# Patient Record
Sex: MALE | Race: WHITE | NOT HISPANIC OR LATINO | Employment: STUDENT | URBAN - METROPOLITAN AREA
[De-identification: names, ages, dates, MRNs, and addresses within clinical notes are randomized per-mention and may not be internally consistent; named-entity substitution may affect disease eponyms.]

---

## 2024-08-02 ENCOUNTER — HOSPITAL ENCOUNTER (OUTPATIENT)
Dept: RADIOLOGY | Facility: HOSPITAL | Age: 19
Discharge: HOME | End: 2024-08-02
Payer: COMMERCIAL

## 2024-08-02 ENCOUNTER — OFFICE VISIT (OUTPATIENT)
Dept: ORTHOPEDIC SURGERY | Facility: HOSPITAL | Age: 19
End: 2024-08-02
Payer: COMMERCIAL

## 2024-08-02 DIAGNOSIS — M25.562 LEFT KNEE PAIN, UNSPECIFIED CHRONICITY: Primary | ICD-10-CM

## 2024-08-02 DIAGNOSIS — S83.282A TEAR OF LATERAL MENISCUS OF LEFT KNEE, INITIAL ENCOUNTER: Primary | ICD-10-CM

## 2024-08-02 DIAGNOSIS — M25.562 LEFT KNEE PAIN, UNSPECIFIED CHRONICITY: ICD-10-CM

## 2024-08-02 DIAGNOSIS — S83.282A TEAR OF LATERAL MENISCUS OF LEFT KNEE, INITIAL ENCOUNTER: ICD-10-CM

## 2024-08-02 PROCEDURE — 73564 X-RAY EXAM KNEE 4 OR MORE: CPT | Mod: LT

## 2024-08-02 PROCEDURE — 73721 MRI JNT OF LWR EXTRE W/O DYE: CPT | Mod: LT

## 2024-08-02 PROCEDURE — 99213 OFFICE O/P EST LOW 20 MIN: CPT | Performed by: ORTHOPAEDIC SURGERY

## 2024-08-02 NOTE — PROGRESS NOTES
HPI  19 y.o. male here today for follow up on Left knee pain. Patient was seen previously in 2021 for his L knee patient states that he been doing well with the knee up until a recent trip to [place] and then after golfing when he felt some acute pain on the lateral side of his knee and developed a fairly large effusion.  He comes to see me today for further evaluation he has been doing anti-inflammatories activity modification and modifying his activities and some therapeutic exercises but continues to have pain and swelling    This is a pleasant patient in no acute distress.  They are alert and oriented x3.  They are of normal mood and affect.  They are in no acute distress.  The patient's limb is warm and well-perfused.  They have intact sensation to light touch in all lower extremity dermatomes.  The patient's quadriceps and hamstring strength is 5 of 5.  The patient can do a straight leg raise    ROM is from 0-125 deg    The patient has a weak core.  The patient has tight hamstrings.  The patient has one out of three patellofemoral crepitus.  They have some mild increased patellar tilt    They have  a stable Lachman, negative posterior drawer.  No posterior sag.  The patient is stable to varus and valgus stress at both 0 and 30°    There is mild medial joint line tenderness.  Significant lateral jointline tenderness, positive Jaspreet's localized to lateral joint line with mechanical symptoms    The patient has moderate effusion    IMAGING  X-rays reviewed today reveal no gross fracture or dislocation.  Preserved joint spaces.    MRI - No MRI for review        ASSESSMENT/PLAN  This is a 19 y.o. male here today with pain secondary to  meniscus tear    Plan for an MRI for further evaluation of the Meniscus as pt has positive McMurrays on exam.  Follow up after imaging is complete.

## 2024-08-05 ENCOUNTER — TELEPHONE (OUTPATIENT)
Dept: ORTHOPEDIC SURGERY | Facility: HOSPITAL | Age: 19
End: 2024-08-05
Payer: COMMERCIAL

## 2024-08-05 ENCOUNTER — APPOINTMENT (OUTPATIENT)
Dept: ORTHOPEDIC SURGERY | Facility: HOSPITAL | Age: 19
End: 2024-08-05
Payer: COMMERCIAL

## 2024-08-05 DIAGNOSIS — M25.562 LEFT KNEE PAIN, UNSPECIFIED CHRONICITY: Primary | ICD-10-CM

## 2024-08-05 RX ORDER — MELOXICAM 15 MG/1
15 TABLET ORAL DAILY
Qty: 14 TABLET | Refills: 0 | Status: SHIPPED | OUTPATIENT
Start: 2024-08-05 | End: 2024-08-19

## 2024-08-05 RX ORDER — MELOXICAM 15 MG/1
15 TABLET ORAL DAILY
Qty: 14 TABLET | Refills: 0 | Status: CANCELLED | OUTPATIENT
Start: 2024-08-05 | End: 2024-08-19

## 2024-08-05 NOTE — TELEPHONE ENCOUNTER
Called patient to find out what pharmacy he would like us to send the Meloxicam order to.  Patient states the wants it to  go to the [facility] in [place] at [address] CARTER

## 2024-08-07 ENCOUNTER — HOSPITAL ENCOUNTER (OUTPATIENT)
Dept: RADIOLOGY | Facility: CLINIC | Age: 19
Discharge: HOME | End: 2024-08-07
Payer: COMMERCIAL

## 2024-08-07 ENCOUNTER — OFFICE VISIT (OUTPATIENT)
Dept: RHEUMATOLOGY | Facility: CLINIC | Age: 19
End: 2024-08-07
Payer: COMMERCIAL

## 2024-08-07 ENCOUNTER — LAB (OUTPATIENT)
Dept: LAB | Facility: LAB | Age: 19
End: 2024-08-07
Payer: COMMERCIAL

## 2024-08-07 VITALS
DIASTOLIC BLOOD PRESSURE: 66 MMHG | BODY MASS INDEX: 20.81 KG/M2 | WEIGHT: 157 LBS | SYSTOLIC BLOOD PRESSURE: 108 MMHG | HEIGHT: 73 IN | OXYGEN SATURATION: 96 % | HEART RATE: 84 BPM

## 2024-08-07 DIAGNOSIS — M45.9 ANKYLOSING SPONDYLITIS, UNSPECIFIED SITE OF SPINE (MULTI): ICD-10-CM

## 2024-08-07 DIAGNOSIS — M45.9 ANKYLOSING SPONDYLITIS, UNSPECIFIED SITE OF SPINE (MULTI): Primary | ICD-10-CM

## 2024-08-07 LAB
ALBUMIN SERPL BCP-MCNC: 4.2 G/DL (ref 3.4–5)
ALP SERPL-CCNC: 102 U/L (ref 33–120)
ALT SERPL W P-5'-P-CCNC: 12 U/L (ref 10–52)
ANION GAP SERPL CALC-SCNC: 13 MMOL/L (ref 10–20)
AST SERPL W P-5'-P-CCNC: 15 U/L (ref 9–39)
BILIRUB SERPL-MCNC: 0.3 MG/DL (ref 0–1.2)
BUN SERPL-MCNC: 15 MG/DL (ref 6–23)
CALCIUM SERPL-MCNC: 9.6 MG/DL (ref 8.6–10.6)
CHLORIDE SERPL-SCNC: 103 MMOL/L (ref 98–107)
CK SERPL-CCNC: 90 U/L (ref 0–325)
CO2 SERPL-SCNC: 31 MMOL/L (ref 21–32)
CREAT SERPL-MCNC: 0.93 MG/DL (ref 0.5–1.3)
CRP SERPL-MCNC: 2.24 MG/DL
EGFRCR SERPLBLD CKD-EPI 2021: >90 ML/MIN/1.73M*2
GLUCOSE SERPL-MCNC: 71 MG/DL (ref 74–99)
HBV CORE AB SER QL: NONREACTIVE
HBV CORE IGM SER QL: NONREACTIVE
HBV SURFACE AB SER-ACNC: <3.1 MIU/ML
HBV SURFACE AG SERPL QL IA: NONREACTIVE
HCV AB SER QL: NONREACTIVE
POTASSIUM SERPL-SCNC: 4.6 MMOL/L (ref 3.5–5.3)
PROT SERPL-MCNC: 7.8 G/DL (ref 6.4–8.2)
RHEUMATOID FACT SER NEPH-ACNC: 17 IU/ML (ref 0–15)
SODIUM SERPL-SCNC: 142 MMOL/L (ref 136–145)

## 2024-08-07 PROCEDURE — 82550 ASSAY OF CK (CPK): CPT

## 2024-08-07 PROCEDURE — 86780 TREPONEMA PALLIDUM: CPT

## 2024-08-07 PROCEDURE — 86704 HEP B CORE ANTIBODY TOTAL: CPT

## 2024-08-07 PROCEDURE — 87491 CHLMYD TRACH DNA AMP PROBE: CPT

## 2024-08-07 PROCEDURE — 72202 X-RAY EXAM SI JOINTS 3/> VWS: CPT

## 2024-08-07 PROCEDURE — 87389 HIV-1 AG W/HIV-1&-2 AB AG IA: CPT

## 2024-08-07 PROCEDURE — 36415 COLL VENOUS BLD VENIPUNCTURE: CPT

## 2024-08-07 PROCEDURE — 72110 X-RAY EXAM L-2 SPINE 4/>VWS: CPT | Performed by: STUDENT IN AN ORGANIZED HEALTH CARE EDUCATION/TRAINING PROGRAM

## 2024-08-07 PROCEDURE — 86235 NUCLEAR ANTIGEN ANTIBODY: CPT

## 2024-08-07 PROCEDURE — 81381 HLA I TYPING 1 ALLELE HR: CPT

## 2024-08-07 PROCEDURE — 86706 HEP B SURFACE ANTIBODY: CPT

## 2024-08-07 PROCEDURE — 72120 X-RAY BEND ONLY L-S SPINE: CPT

## 2024-08-07 PROCEDURE — 86225 DNA ANTIBODY NATIVE: CPT

## 2024-08-07 PROCEDURE — 81001 URINALYSIS AUTO W/SCOPE: CPT

## 2024-08-07 PROCEDURE — 99205 OFFICE O/P NEW HI 60 MIN: CPT | Performed by: STUDENT IN AN ORGANIZED HEALTH CARE EDUCATION/TRAINING PROGRAM

## 2024-08-07 PROCEDURE — 86803 HEPATITIS C AB TEST: CPT

## 2024-08-07 PROCEDURE — 80053 COMPREHEN METABOLIC PANEL: CPT

## 2024-08-07 PROCEDURE — 86038 ANTINUCLEAR ANTIBODIES: CPT

## 2024-08-07 PROCEDURE — 87340 HEPATITIS B SURFACE AG IA: CPT

## 2024-08-07 PROCEDURE — 86431 RHEUMATOID FACTOR QUANT: CPT

## 2024-08-07 PROCEDURE — 86481 TB AG RESPONSE T-CELL SUSP: CPT

## 2024-08-07 PROCEDURE — 86618 LYME DISEASE ANTIBODY: CPT

## 2024-08-07 PROCEDURE — 86140 C-REACTIVE PROTEIN: CPT

## 2024-08-07 PROCEDURE — 82085 ASSAY OF ALDOLASE: CPT

## 2024-08-07 PROCEDURE — 1036F TOBACCO NON-USER: CPT | Performed by: STUDENT IN AN ORGANIZED HEALTH CARE EDUCATION/TRAINING PROGRAM

## 2024-08-07 PROCEDURE — 85652 RBC SED RATE AUTOMATED: CPT

## 2024-08-07 PROCEDURE — 3008F BODY MASS INDEX DOCD: CPT | Performed by: STUDENT IN AN ORGANIZED HEALTH CARE EDUCATION/TRAINING PROGRAM

## 2024-08-07 PROCEDURE — 72202 X-RAY EXAM SI JOINTS 3/> VWS: CPT | Performed by: STUDENT IN AN ORGANIZED HEALTH CARE EDUCATION/TRAINING PROGRAM

## 2024-08-07 PROCEDURE — 85025 COMPLETE CBC W/AUTO DIFF WBC: CPT

## 2024-08-07 PROCEDURE — 87591 N.GONORRHOEAE DNA AMP PROB: CPT

## 2024-08-07 PROCEDURE — 86200 CCP ANTIBODY: CPT

## 2024-08-07 PROCEDURE — 86705 HEP B CORE ANTIBODY IGM: CPT

## 2024-08-07 NOTE — PATIENT INSTRUCTIONS
"I want to work you up for a few things    This seems like an \"inflammatory arthritis \" but you dont have too many signs today    Things to consider are ankylosing spondylitis/spondylarthritis      Lyme less likely but we can get the test for it    Lets get a rheumatoid panel, xrays of your lower back today, a HLAb27 test, inflammatory markers    If you're here on a day that your knee is swollen, call and make an appt, and I can drain it and see what's in the fluid (I am here M-Th)    I will reach out to you tomorrow and let you know what the xrays showed; and then I can order a stat MRI unless its very obvious        "

## 2024-08-07 NOTE — PROGRESS NOTES
Subjective   Patient ID: Mo Issa is a 19 y.o. male who presents for No chief complaint on file..  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 Mild marrow edema in particular about the proximal tibiofibular articulation as well as posterior nonweightbearing lateral femoral condyle and anterior margin of the lateral femoral condyle. This is seen in conjunction with minimal marrow edema outer margin medial femoral condyle and posterior margin medial tibial plateau. In the setting of trauma these findings suggest bone bruise as a potential etiology. However, without corresponding history of trauma other potential etiologies such as inflammatory arthropathy or even bone marrow edema syndrome with no other potential etiology found.      2. No internal derangement. No meniscal tear. No sequela of ligamentous sprain.      3. Small knee joint effusion.      4. Small leaking popliteal cyst with moderate fluid tracking from the popliteal cyst caudally along the medial head of the gastrocnemius.      5. No evidence for OCD lesion            Rheumatology specific review of systems  joint pain in both knees and back and hips,  "some days stiff in lower back for half an hour when he wakes up; usually unilateral, but can be left or right, fevers , chills, unintentional weight loss, rashes, alopecia, mouth sores, nasal ulcers, photosensitivity, Raynauds, dry eyes, dry mouth, blood clots,  rheum fam hx, uveitis, blood or mucus in stool     Chronic pain specific review of systems  widespread pain , widespread tenderness, brain fog, depression/anxiety, migraines or tension headaches, IBS or heartburn symptoms, irritable or overactive bladder, pelvic pain ,TMJ pain,poor sleep, fatigue    Objective   There were no vitals taken for this visit.      Physical Exam  Constitutional: Alert and in no acute distress. Well developed, well nourished  Head and Face: Head and face: Normal.    Cardiovascular: Heart rate and rhythm were normal, normal S1 and S2. No peripheral edema.   Pulmonary: No respiratory distress. Clear bilateral breath sounds.  Musculoskeletal: no synovitis throughout, no SI joint tenderness, full strength throughout  Skin: Normal skin color and pigmentation, normal skin turgor, and no rash.    Psychiatric: Judgment and insight: Intact. Mood and affect: Normal.     Lab Results   Component Value Date    WBC 5.3 06/08/2020    HGB 13.0 06/08/2020    HCT 40.2 06/08/2020     06/08/2020    ALT 11 06/08/2020    AST 19 06/08/2020    CREATININE 0.74 06/08/2020          No results found for: \"ANANP\", \"ANATITERADD\", \"ANACO\", \"ANAPATTRN\", \"ANPA2\", \"FANAP\", \"ANATITER\", \"ANAT2\", \"SONAL\", \"CDCANA\", \"DSDN\", \"EMPSMRNP\", \"SCLN\", \"SCLABQ\", \"SCIB\", \"CTIB\", \"FRUIWP00\", \"FITFDZ26\", \"ASSB\", \"SSBB\", \"C3\", \"C4\", \"UAMICCOMM\", \"UTPCR\", \"ANTIRIBO\", \"ACEN\", \"SEDRATE\", \"NONUHFIRE\", \"POCESR\", \"CRP\", \"RF\", \"CCPIGGQUAL\"\\            There is currently no information documented on the homunculus. Go to the Rheumatology activity and complete the homunculus joint exam.          Assessment/Plan:  #Concern for inflammatory arthritis  -Top of differential is " spondylarthritis; x-ray SI joints and lumbosacral spine done today.  I reviewed films with musculoskeletal radiology, concerning for subchondral sclerosis of the SI joints which could be seen in the setting of sacroiliitis; but needs MRI to further delineate-MRI sacrum ordered stat August 2024  -Will send full autoimmune panel  -Patient's family concerned about lyme; I sent two-tiered testing, but discussed that this was not high on my differential given the time course and relapsing and remitting nature of his pain  -Less likely things are reactive arthritis versus RA; sent autoimmune labs and urine studies  -Things that point towards a possible spondylarthritis : Subchondral sclerosis on August 2024 SI joint x-rays done today, response to NSAIDs, bone marrow edema on MRI of left knee, inflammatory back pain symptoms at young age, recurrent swelling of knees    Will get autoimmune panel today, MRI sacrum stat, and follow-up with patient telemedicine in 2 weeks since patient is going to be in [place] at that time    Patient counseled to seek medical care if any new or worsening symptoms, urgently if needed.      Note will be sent to primary care doctor and referring physician, also discussed with referring physician    Return to clinic in 2 wk,  sooner if needed    Dragon dictation software was used to dictate this note. Errors may have occurred during dictation that was not intended by the user.

## 2024-08-08 LAB
ANA SER QL HEP2 SUBST: NEGATIVE
APPEARANCE UR: ABNORMAL
BASOPHILS # BLD AUTO: 0.05 X10*3/UL (ref 0–0.1)
BASOPHILS NFR BLD AUTO: 1 %
BILIRUB UR STRIP.AUTO-MCNC: NEGATIVE MG/DL
C TRACH RRNA SPEC QL NAA+PROBE: NEGATIVE
CAOX CRY #/AREA UR COMP ASSIST: NORMAL /HPF
CCP IGG SERPL-ACNC: <1 U/ML
CENTROMERE B AB SER-ACNC: <0.2 AI
CHROMATIN AB SERPL-ACNC: <0.2 AI
COLOR UR: YELLOW
DSDNA AB SER-ACNC: <1 IU/ML
ENA JO1 AB SER QL IA: <0.2 AI
ENA RNP AB SER IA-ACNC: <0.2 AI
ENA SCL70 AB SER QL IA: <0.2 AI
ENA SM AB SER IA-ACNC: <0.2 AI
ENA SM+RNP AB SER QL IA: <0.2 AI
ENA SS-A AB SER IA-ACNC: <0.2 AI
ENA SS-B AB SER IA-ACNC: <0.2 AI
EOSINOPHIL # BLD AUTO: 0.21 X10*3/UL (ref 0–0.7)
EOSINOPHIL NFR BLD AUTO: 4.2 %
ERYTHROCYTE [DISTWIDTH] IN BLOOD BY AUTOMATED COUNT: 12.8 % (ref 11.5–14.5)
ERYTHROCYTE [SEDIMENTATION RATE] IN BLOOD BY WESTERGREN METHOD: 23 MM/H (ref 0–15)
GLUCOSE UR STRIP.AUTO-MCNC: NORMAL MG/DL
HCT VFR BLD AUTO: 45.4 % (ref 41–52)
HGB BLD-MCNC: 14.5 G/DL (ref 13.5–17.5)
HIV 1+2 AB+HIV1 P24 AG SERPL QL IA: NONREACTIVE
IMM GRANULOCYTES # BLD AUTO: 0.01 X10*3/UL (ref 0–0.7)
IMM GRANULOCYTES NFR BLD AUTO: 0.2 % (ref 0–0.9)
KETONES UR STRIP.AUTO-MCNC: NEGATIVE MG/DL
LEUKOCYTE ESTERASE UR QL STRIP.AUTO: NEGATIVE
LYMPHOCYTES # BLD AUTO: 2.17 X10*3/UL (ref 1.2–4.8)
LYMPHOCYTES NFR BLD AUTO: 43.1 %
MCH RBC QN AUTO: 26.7 PG (ref 26–34)
MCHC RBC AUTO-ENTMCNC: 31.9 G/DL (ref 32–36)
MCV RBC AUTO: 84 FL (ref 80–100)
MONOCYTES # BLD AUTO: 0.54 X10*3/UL (ref 0.1–1)
MONOCYTES NFR BLD AUTO: 10.7 %
MUCOUS THREADS #/AREA URNS AUTO: NORMAL /LPF
N GONORRHOEA DNA SPEC QL PROBE+SIG AMP: NEGATIVE
NEUTROPHILS # BLD AUTO: 2.06 X10*3/UL (ref 1.2–7.7)
NEUTROPHILS NFR BLD AUTO: 40.8 %
NITRITE UR QL STRIP.AUTO: NEGATIVE
NRBC BLD-RTO: 0 /100 WBCS (ref 0–0)
PH UR STRIP.AUTO: 6.5 [PH]
PLATELET # BLD AUTO: 427 X10*3/UL (ref 150–450)
PROT UR STRIP.AUTO-MCNC: ABNORMAL MG/DL
RBC # BLD AUTO: 5.43 X10*6/UL (ref 4.5–5.9)
RBC # UR STRIP.AUTO: NEGATIVE /UL
RBC #/AREA URNS AUTO: NORMAL /HPF
RIBOSOMAL P AB SER-ACNC: <0.2 AI
SP GR UR STRIP.AUTO: 1.03
TREPONEMA PALLIDUM IGG+IGM AB [PRESENCE] IN SERUM OR PLASMA BY IMMUNOASSAY: NONREACTIVE
UROBILINOGEN UR STRIP.AUTO-MCNC: NORMAL MG/DL
WBC # BLD AUTO: 5 X10*3/UL (ref 4.4–11.3)
WBC #/AREA URNS AUTO: NORMAL /HPF

## 2024-08-09 LAB
ALDOLASE SERPL-CCNC: 3.3 U/L (ref 1.2–7.6)
B BURGDOR.VLSE1+PEPC10 AB SER IA-ACNC: 0.31 IV
NIL(NEG) CONTROL SPOT COUNT: NORMAL
PANEL A SPOT COUNT: 0
PANEL B SPOT COUNT: 0
POS CONTROL SPOT COUNT: NORMAL
T-SPOT. TB INTERPRETATION: NEGATIVE

## 2024-08-12 LAB — HLAB27 TYPING: NEGATIVE

## 2024-08-21 ENCOUNTER — APPOINTMENT (OUTPATIENT)
Dept: RHEUMATOLOGY | Facility: CLINIC | Age: 19
End: 2024-08-21
Payer: COMMERCIAL

## 2024-09-09 ENCOUNTER — APPOINTMENT (OUTPATIENT)
Dept: RHEUMATOLOGY | Facility: CLINIC | Age: 19
End: 2024-09-09
Payer: COMMERCIAL

## 2024-09-09 DIAGNOSIS — M47.819 SPONDYLARTHRITIS: Primary | ICD-10-CM

## 2024-09-09 DIAGNOSIS — R76.8 RHEUMATOID FACTOR POSITIVE: ICD-10-CM

## 2024-09-09 PROCEDURE — 99214 OFFICE O/P EST MOD 30 MIN: CPT | Performed by: STUDENT IN AN ORGANIZED HEALTH CARE EDUCATION/TRAINING PROGRAM

## 2024-09-09 NOTE — PROGRESS NOTES
Subjective   Patient ID: Mo Issa is a 19 y.o. male who presents for No chief complaint on file..  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    Diagnosed with axial spondylarthritis with also peripheral manifestations by myself 2024 based off of x-ray and MRI showing sacroiliitis, frequent inflammatory back pain, recurrent swelling of knees, elevated inflammatory markers    Rheum hx per patient:  -Since , patients knee has been swelling up; happens every month or two- can be either  -lower back pain started around age [age]  -takes ibuprofen,ice,or does nothing and it goes away   -first week of , left knee was swollen  -Dr Dallas did the MRI this year due to the swollen knee; per Dr Dallas, no structural abnormalities, but MRI read showed some marrow edema  -Establish care with myself 2024, diagnosed with axial and peripheral spondyloarthritis based off of symptoms, x-ray of SI joints, and MRI SI joints and elevated inflammatory markers    Socially drinks; -/-    Labs:   : WBC normal, Hgb normal, platelets normal  Cr normal, ALP/AST/ALT/albumin/protein normal  CRP H to 2.24, ESR H to 23    Infectious:  : Hep B S ag neg, Hep B Core total neg, Hep C Ab neg, T spot neg  Chlamydia/gonorrhea negative    Serologies:  CK and aldolase normal  HLAb27 negative  Negative SONAL and neg DEREK including dsdna, Sm, RNP, Sm/RNP, SSA, SSB, Scl-70, centromere, Maria Teresa-1, chromatin, ribosomal p  Positive RF to 17, CCP negative  Lyme screen negative  UA no blood, trace protein    Imagin2024 XR LS spine and SI joints:  1. Asymmetric, left-greater-than-right,  sclerosis of the endplates of the sacroiliac joints bilaterally. Although this may be a benign process such as osteitis condensans ilii, given the clinical concern,  this may represent sequela of inflammatory sacroiliitis.  2. Irregularity with osseous erosive changes of the pubic symphysis. 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 Clinical correlation with high levels of athletic activity and consider athletic pubalgia MRI protocol for further assessment. The above  findings are new from prior radiograph dated 03/09/2020.    8/2024 MRI Sacrum (done at [facility] radiology) - report uploaded to chart  Findings: There is edema-like signal along both sacral and iliac spines of the SI joints, left greater than right.  There is also subcortical sclerosis, particularly along the iliac sides of both SI joints.  Impression: Bilateral sacroiliitis without evidence of bony ankylosis    8/2024 MRI L knee  IMPRESSION:  1. Mild marrow edema in particular about the proximal tibiofibular articulation as well as posterior nonweightbearing lateral femoral condyle and anterior margin of the lateral femoral condyle. This is seen in conjunction with minimal marrow edema outer margin medial femoral condyle and posterior margin medial tibial plateau. In the setting of trauma these findings suggest bone bruise as a potential etiology. However, without corresponding history of trauma other potential etiologies such as inflammatory arthropathy or even bone marrow edema syndrome with no other potential etiology found.      2. No internal derangement. No meniscal tear. No sequela of ligamentous sprain.      3. Small knee joint effusion.      4. Small leaking popliteal cyst with moderate fluid tracking from the popliteal cyst caudally along the medial head of the gastrocnemius.      5. No evidence for OCD lesion            Rheumatology specific review of  systems  joint pain in both knees and back and hips, some days stiff in lower back for half an hour when he wakes up; usually unilateral, but can be left or right, fevers , chills, unintentional weight loss, rashes, alopecia, mouth sores, nasal ulcers, photosensitivity, Raynauds, dry eyes, dry mouth, blood clots,  rheum fam hx, uveitis, blood or mucus in stool     Chronic pain specific review of systems  widespread pain , widespread tenderness, brain fog, depression/anxiety, migraines or tension headaches, IBS or heartburn symptoms, irritable or overactive bladder, pelvic pain ,TMJ pain,poor sleep, fatigue    Objective   There were no vitals taken for this visit.      Physical Exam  Constitutional: Alert and in no acute distress. Well developed, well nourished       Lab Results   Component Value Date    WBC 5.0 08/07/2024    HGB 14.5 08/07/2024    HCT 45.4 08/07/2024     08/07/2024    ALT 12 08/07/2024    AST 15 08/07/2024    CREATININE 0.93 08/07/2024          Lab Results   Component Value Date    SONAL Negative 08/07/2024    ASSB <0.2 08/07/2024    ANTIRIBO <0.2 08/07/2024    ACEN <0.2 08/07/2024    SEDRATE 23 (H) 08/07/2024    CRP 2.24 (H) 08/07/2024    RF 17 (H) 08/07/2024   \\            There is currently no information documented on the homunculus. Go to the Rheumatology activity and complete the homunculus joint exam.          Assessment/Plan:  # Axial and peripheral spondyloarthritis  -Top of inflammatory back pain, recurrent swelling of knees, elevated ESR, elevated CRP, x-ray and MRI of SI joints showing sacroiliitis  -HLA-B27 negative  -T spot and hep serologies -2024  -Discussed trialing a daily NSAID versus a TNF inhibitor; patient would like to start with daily NSAID  -Will start meloxicam 15 mg daily; patient instructed to send me a MyChart with his pharmacy of choice, and then I can send 90 days of meloxicam 15 mg daily with 3 refills  -Risks of NSAIDS discussed including but not limited to  stomach problems (such as bleeding, ulcer and stomach upset), kidney problems, high blood pressure, heart problems, fluid retention, rashes,and allergic reaction. Patient understanding and aware of risks.  -Given trace protein in urine August 2024, will repeat UA with next visit, and also, get a protein to creatinine ratio  -Will recheck labs and urine with next visit; will also get a PTH as x-ray wrote that there could be concern for hyperparathyroidism; however, I suspect that all of his findings are due to his spondyloarthritis      #+RF  -Nonspecific, low titer, not likely contributory    Note will be sent to primary care doctor and referring physician    Return to clinic in 3-4 mo,  sooner if needed    Dragon dictation software was used to dictate this note. Errors may have occurred during dictation that was not intended by the user.

## 2024-09-19 ENCOUNTER — PATIENT MESSAGE (OUTPATIENT)
Dept: RHEUMATOLOGY | Facility: CLINIC | Age: 19
End: 2024-09-19
Payer: COMMERCIAL

## 2024-09-19 DIAGNOSIS — M45.9 ANKYLOSING SPONDYLITIS, UNSPECIFIED SITE OF SPINE (MULTI): Primary | ICD-10-CM

## 2024-09-22 RX ORDER — MELOXICAM 15 MG/1
15 TABLET ORAL DAILY
Qty: 90 TABLET | Refills: 3 | Status: SHIPPED | OUTPATIENT
Start: 2024-09-22 | End: 2025-09-22

## 2025-01-08 ENCOUNTER — APPOINTMENT (OUTPATIENT)
Dept: RHEUMATOLOGY | Facility: CLINIC | Age: 20
End: 2025-01-08
Payer: COMMERCIAL

## 2025-01-08 DIAGNOSIS — M47.819 SPONDYLARTHRITIS: Primary | ICD-10-CM

## 2025-01-08 DIAGNOSIS — R76.8 RHEUMATOID FACTOR POSITIVE: ICD-10-CM

## 2025-01-08 DIAGNOSIS — M45.9 ANKYLOSING SPONDYLITIS, UNSPECIFIED SITE OF SPINE (MULTI): ICD-10-CM

## 2025-01-08 PROCEDURE — 99214 OFFICE O/P EST MOD 30 MIN: CPT | Performed by: STUDENT IN AN ORGANIZED HEALTH CARE EDUCATION/TRAINING PROGRAM

## 2025-01-08 NOTE — PROGRESS NOTES
Subjective   Patient ID: Mo Issa is a 19 y.o. male who presents for No chief complaint on file..  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    Diagnosed with axial spondylarthritis with also peripheral manifestations by myself September 2024 based off of x-ray and MRI showing sacroiliitis, frequent inflammatory back pain, recurrent swelling of knees, elevated inflammatory markers    Started daily meloxicam September 2024, on his own patient is going down to taking it as needed with about 3-4 times a week, 0 symptoms on this regimen    Rheum hx per patient:  -Since 2021, patients knee has been swelling up; happens every month or two- can be either  -lower back pain started around age 13/15  -takes ibuprofen,ice,or does nothing and it goes away   -first week of 8.2024, left knee was swollen  -Dr Dallas did the MRI this year due to the swollen knee; per Dr Dallas, no structural abnormalities, but MRI read showed some marrow edema  -Establish care with myself August 2024, diagnosed with axial and peripheral spondyloarthritis based off of symptoms, x-ray of SI joints, and MRI SI joints and elevated inflammatory markers    Socially drinks; -/-    Labs:   2024: WBC normal, Hgb normal, platelets normal  Cr normal, ALP/AST/ALT/albumin/protein normal  CRP H to 2.24, ESR H to 23    Infectious:  2024: Hep B S ag neg, Hep B Core total neg, Hep C Ab neg, T spot neg  Chlamydia/gonorrhea negative    Serologies:  CK and aldolase normal  HLAb27 negative  Negative SONAL and neg DEREK including dsdna, Sm, RNP, Sm/RNP, SSA, SSB, Scl-70, centromere, Maria Teresa-1, chromatin, ribosomal p  Positive RF to  Triage to call tomorrow so we can assist making pulm appt 17, CCP negative  Lyme screen negative  UA no blood, trace protein    Imagin2024 XR LS spine and SI joints:  1. Asymmetric, left-greater-than-right, sclerosis of the endplates of the sacroiliac joints bilaterally. Although this may be a benign process such as osteitis condensans ilii, given the clinical concern,  this may represent sequela of inflammatory sacroiliitis.  2. Irregularity with osseous erosive changes of the pubic symphysis. 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 Clinical correlation with high levels of athletic activity and consider athletic pubalgia MRI protocol for further assessment. The above  findings are new from prior radiograph dated 2020.    2024 MRI Sacrum (done at [facility] radiology) - report uploaded to chart  Findings: There is edema-like signal along both sacral and iliac spines of the SI joints, left greater than right.  There is also subcortical sclerosis, particularly along the iliac sides of both SI joints.  Impression: Bilateral sacroiliitis without evidence of bony ankylosis    2024 MRI L knee  IMPRESSION:  1. Mild marrow edema in particular about the proximal tibiofibular articulation as well as posterior nonweightbearing lateral femoral condyle and anterior margin of the lateral femoral condyle. This is seen in conjunction with minimal marrow edema outer margin medial femoral condyle and posterior margin medial tibial plateau. In the setting of trauma these findings suggest bone bruise as a potential etiology. However, without corresponding history of trauma other potential etiologies such as inflammatory arthropathy or even bone marrow edema syndrome with no other potential etiology found.      2. No internal derangement. No meniscal tear. No sequela of ligamentous sprain.      3. Small knee joint effusion.      4. Small leaking popliteal cyst with moderate fluid tracking from  the popliteal cyst caudally along the medial head of the gastrocnemius.      5. No evidence for OCD lesion            Rheumatology specific review of systems  joint pain in both knees and back and hips, some days stiff in lower back for half an hour when he wakes up; usually unilateral, but can be left or right, fevers , chills, unintentional weight loss, rashes, alopecia, mouth sores, nasal ulcers, photosensitivity, Raynauds, dry eyes, dry mouth, blood clots,  rheum fam hx, uveitis, blood or mucus in stool     Chronic pain specific review of systems  widespread pain , widespread tenderness, brain fog, depression/anxiety, migraines or tension headaches, IBS or heartburn symptoms, irritable or overactive bladder, pelvic pain ,TMJ pain,poor sleep, fatigue    Objective   There were no vitals taken for this visit.      Physical Exam  Constitutional: Alert and in no acute distress. Well developed, well nourished       Lab Results   Component Value Date    WBC 5.0 08/07/2024    HGB 14.5 08/07/2024    HCT 45.4 08/07/2024     08/07/2024    ALT 12 08/07/2024    AST 15 08/07/2024    CREATININE 0.93 08/07/2024          Lab Results   Component Value Date    SONAL Negative 08/07/2024    ASSB <0.2 08/07/2024    ANTIRIBO <0.2 08/07/2024    ACEN <0.2 08/07/2024    SEDRATE 23 (H) 08/07/2024    CRP 2.24 (H) 08/07/2024    RF 17 (H) 08/07/2024   \\            There is currently no information documented on the homunculus. Go to the Rheumatology activity and complete the homunculus joint exam.          Assessment/Plan:  # Axial and peripheral spondyloarthritis  -Top of inflammatory back pain, recurrent swelling of knees, elevated ESR, elevated CRP, x-ray and MRI of SI joints showing sacroiliitis  -HLA-B27 negative  -T spot and hep serologies -2024  -Discussed trialing a daily NSAID versus a TNF inhibitor; patient would like to start with daily NSAID  -started meloxicam 15 mg daily 9/2024 but patient is taking prn: taking it 3-4  times a week, this is okay since patient is with 0 sxs on this regiment; 1 year of meloxicam rx 9/2024  -Risks of NSAIDS discussed including but not limited to stomach problems (such as bleeding, ulcer and stomach upset), kidney problems, high blood pressure, heart problems, fluid retention, rashes,and allergic reaction. Patient understanding and aware of risks.  -Given trace protein in urine August 2024, will repeat UA with next visit, and also, get a protein to creatinine ratio  -Will recheck labs and urine (ordered 9/2024, patient counseled to get); will also get a PTH as x-ray wrote that there could be concern for hyperparathyroidism; however, I suspect that all of his findings are due to his spondyloarthritis      #+RF  -Nonspecific, low titer, not likely contributory    #Preventative Health Recommendations for Primary Care Providers    Vaccine Recommendations:    From ACR 2022 vaccine recommendations:    For Rheumatic and musculoskeletal disease (RMD) patients aged greater than or equal to 65 years, and RMD patients aged >18 and <65 years who are on immunosuppressive medication, giving high-dose or adjuvanted influenza vaccination is conditionally  recommended over giving regular-dose influenza vaccination.    For patients with RMD aged <65 years who are on immunosuppressive medication, pneumococcal vaccination is strongly recommended.    For patients with RMD aged >18 years who are on immunosuppressive medication, administering the recombinant zoster vaccine is strongly recommended.    For patients with RMD aged >26 and <45 years who are on immunosuppressive medication and not previously vaccinated, vaccination against HPV is conditionally recommended.    Cardiovascular Recommendations:    Patients with inflammatory diseases are at high risk for cardiovascular disease, and should be aggressively screened by primary care physicians and started on lipid-lowering medications when appropriate.    Bone Health  Recommendations:    Patients on steroids should be on calcium and Vitamin D. Patients with inflammatory rheumatic diseases are higher risk for osteoporosis and should have baseline DEXA screening.     Cervical Cancer Screening Recommendations (adapted from [facility]P)  Cytology is recommended if younger than 30 years.  Co-testing is preferred, but cytology is acceptable if 30 years or older.  If using cytology alone, perform annual cervical cytology. If results of 3 consecutive cytology results are normal, perform cytology every 3 years.  If using co-testing, perform baseline co-test with cytology and HPV. If result of cytology is normal and HPV is negative, co-testing can be performed every 3 years.  Continue screening throughout lifetime (older than 65 years). Discontinue screening based on shared discussion regarding quality and duration of life rather than age.      Note will be sent to primary care doctor and referring physician    Return to clinic in6 mo, sooner if needed    Dragon dictation software was used to dictate this note. Errors may have occurred during dictation that was not intended by the user.        [id number] based off as review of greater than 3 individual tests, ordering of greater than 3 tests, prescription drug management